# Patient Record
Sex: FEMALE | Race: OTHER | HISPANIC OR LATINO | URBAN - METROPOLITAN AREA
[De-identification: names, ages, dates, MRNs, and addresses within clinical notes are randomized per-mention and may not be internally consistent; named-entity substitution may affect disease eponyms.]

---

## 2019-06-30 ENCOUNTER — EMERGENCY (EMERGENCY)
Facility: HOSPITAL | Age: 19
LOS: 0 days | Discharge: HOME | End: 2019-06-30
Admitting: EMERGENCY MEDICINE
Payer: COMMERCIAL

## 2019-06-30 VITALS
SYSTOLIC BLOOD PRESSURE: 125 MMHG | RESPIRATION RATE: 18 BRPM | DIASTOLIC BLOOD PRESSURE: 78 MMHG | OXYGEN SATURATION: 99 % | TEMPERATURE: 99 F | HEART RATE: 70 BPM

## 2019-06-30 DIAGNOSIS — R10.9 UNSPECIFIED ABDOMINAL PAIN: ICD-10-CM

## 2019-06-30 DIAGNOSIS — R10.2 PELVIC AND PERINEAL PAIN: ICD-10-CM

## 2019-06-30 PROCEDURE — 99283 EMERGENCY DEPT VISIT LOW MDM: CPT

## 2019-06-30 RX ORDER — IBUPROFEN 200 MG
600 TABLET ORAL ONCE
Refills: 0 | Status: COMPLETED | OUTPATIENT
Start: 2019-06-30 | End: 2019-06-30

## 2019-06-30 RX ADMIN — Medication 600 MILLIGRAM(S): at 16:32

## 2019-06-30 NOTE — ED PROVIDER NOTE - PHYSICAL EXAMINATION
GEN: Alert & Oriented x 3, No acute distress. Calm, appropriate.  Head and Neck:  No cervical lymphadenopathy.   ENT:Oral mucosa pink, moist without lesions. No pharyngeal injection noted. No exudate. TM clear bilaterally.  Eyes: PERRL. No conjunctival injection. No scleral icterus.   RESP: Lungs clear to auscult bilat. no wheezes, rhonchi or rales. No retractions. Equal air entry.  CARDIO: regular rate and rhythm, no murmurs, rubs or gallops. Normal S1, S2. Radial pulses 2+ bilaterally.  ABD: Soft, Nondistended. No rebound tenderness/guarding.  No pulsatile mass. No tenderness with palpation x 4 quadrants.   MS: Full ROM of extremities.   SKIN: no rashes/lesions, no petechiae, no ecchymosis.  NEURO: CN II-XII grossly intact. Speech and cognition normal.

## 2019-06-30 NOTE — ED PROVIDER NOTE - CARE PROVIDER_API CALL
Brooke Downs)  OBSGYN  Physicians  [address] [place] [address]  Phone: [phone]  Fax: [phone]  Follow Up Time:

## 2019-06-30 NOTE — ED PROVIDER NOTE - NS ED ROS FT
GEN: (-) fever, (-) chills, (-) malaise(-) change in appetite   HEENT: (-) vision changes, (-) HA, (-) sore throat, (-) ear pain  CV: (-) chest pain, (-) palpitations  PULM: (-) cough, (-) wheezing, (-) dyspnea  GI: (+) abdominal pain,(-) Nausea, (-) Vomiting, (-) Diarrhea, (-) Melena  NEURO: (-) weakness, (-) paresthesias, (-) syncope, (-) lightheadedness, (-) dizziness.  : (-) dysuria, (-) frequency, (-) urgency, (-) vaginal bleeding  MS: (-) back pain, (-) joint pain, (-)myalgias, (-) swelling  SKIN: (-) rashes, (-) new lesions  HEME: (-) bleeding, (-) ecchymosis

## 2019-06-30 NOTE — ED PROVIDER NOTE - CLINICAL SUMMARY MEDICAL DECISION MAKING FREE TEXT BOX
pregnancy test neg. spoke with patient about results. offered pt STI testing, pt declining testing at this time. She will follow up with OBGYN. return precautions given.

## 2019-06-30 NOTE — ED ADULT NURSE NOTE - OBJECTIVE STATEMENT
Pt presents with pelvic pain. patient missed period for 5 days, complains of mild cramping, denies vaginal bleeding.

## 2019-06-30 NOTE — ED PROVIDER NOTE - NSFOLLOWUPINSTRUCTIONS_ED_ALL_ED_FT
Pelvic Pain, Female  Image   Pelvic pain is pain in your lower abdomen, below your belly button and between your hips. The pain may start suddenly (acute), keep coming back (recurring), or last a long time (chronic). Pelvic pain that lasts longer than six months is considered chronic.    Pelvic pain may affect your:  Reproductive organs.  Urinary system.  Digestive tract.  Musculoskeletal system.  There are many potential causes of pelvic pain. Sometimes, the pain can be a result of digestive or urinary conditions, strained muscles or ligaments, or even reproductive conditions. Sometimes the cause of pelvic pain is not known.    Follow these instructions at home:  Take over-the-counter and prescription medicines only as told by your health care provider.  Rest as told by your health care provider.  Do not have sex it if hurts.  Keep a journal of your pelvic pain. Write down:  When the pain started.  Where the pain is located.  What seems to make the pain better or worse, such as food or your menstrual cycle.  Any symptoms you have along with the pain.  Keep all follow-up visits as told by your health care provider. This is important.  Contact a health care provider if:  Medicine does not help your pain.  Your pain comes back.  You have new symptoms.  You have abnormal vaginal discharge or bleeding, including bleeding after menopause.  You have a fever or chills.  You are constipated.  You have blood in your urine or stool.  You have foul-smelling urine.  You feel weak or lightheaded.  Get help right away if:  You have sudden severe pain.  Your pain gets steadily worse.  You have severe pain along with fever, nausea, vomiting, or excessive sweating.  You lose consciousness.  This information is not intended to replace advice given to you by your health care provider. Make sure you discuss any questions you have with your health care provider.

## 2019-06-30 NOTE — ED PROVIDER NOTE - NSFOLLOWUPCLINICS_GEN_ALL_ED_FT
[facility]  OB/GYN  [address] [place] [address]  Phone: [phone]  Fax:   Follow Up Time:

## 2019-06-30 NOTE — ED PROVIDER NOTE - OBJECTIVE STATEMENT
The pt is a 19y Female with no significant PMH is presenting to ED with lower abd cramping x 1 day. pt states she developed moderate lower abdominal cramping last night. Non-radiating. No aggravating or reliving factors. She states it feels like a period cramp, but states she has not started her period and is 5 days late. She states she had a neg pregnancy test at home. She denies f/c/n/v, dysuria, urinary urgency/frequency, flank pain, vaginal discharge, uri symptoms, abx use, recent travel, decreased PO intake.

## 2019-08-27 ENCOUNTER — EMERGENCY (EMERGENCY)
Facility: HOSPITAL | Age: 19
LOS: 0 days | Discharge: HOME | End: 2019-08-27
Attending: EMERGENCY MEDICINE | Admitting: EMERGENCY MEDICINE
Payer: COMMERCIAL

## 2019-08-27 VITALS
HEART RATE: 70 BPM | HEIGHT: 68 IN | RESPIRATION RATE: 20 BRPM | TEMPERATURE: 97 F | DIASTOLIC BLOOD PRESSURE: 70 MMHG | WEIGHT: 134.92 LBS | OXYGEN SATURATION: 98 % | SYSTOLIC BLOOD PRESSURE: 148 MMHG

## 2019-08-27 DIAGNOSIS — K92.1 MELENA: ICD-10-CM

## 2019-08-27 DIAGNOSIS — K62.5 HEMORRHAGE OF ANUS AND RECTUM: ICD-10-CM

## 2019-08-27 DIAGNOSIS — Z91.013 ALLERGY TO SEAFOOD: ICD-10-CM

## 2019-08-27 PROCEDURE — 99283 EMERGENCY DEPT VISIT LOW MDM: CPT

## 2019-08-27 RX ORDER — DOCUSATE SODIUM 100 MG
1 CAPSULE ORAL
Qty: 28 | Refills: 0
Start: 2019-08-27 | End: 2019-09-09

## 2019-08-27 NOTE — ED ADULT NURSE NOTE - OBJECTIVE STATEMENT
pt presents to ed with blood in stool x2 episodes with abdominal pain. denies chest pain, sob, fever, chills, fatigue, hematuria, dysuria, n/v.

## 2019-08-27 NOTE — ED PROVIDER NOTE - PATIENT PORTAL LINK FT
You can access the FollowMyHealth Patient Portal offered by [facility] by registering at the following website: [email or web address]. By joining [employer]’s FollowMyHealth portal, you will also be able to view your health information using other applications (apps) compatible with our system.

## 2019-08-27 NOTE — ED PROVIDER NOTE - PROVIDER TOKENS
PROVIDER:[TOKEN:[[id number]]],PROVIDER:[TOKEN:[[id number]]],PROVIDER:[TOKEN:[[id number]]],PROVIDER:[TOKEN:[[id number]]]

## 2019-08-27 NOTE — ED PROVIDER NOTE - CARE PROVIDERS DIRECT ADDRESSES
[email or web address],DirectAddress_Unknown

## 2019-08-27 NOTE — ED PROVIDER NOTE - ATTENDING CONTRIBUTION TO CARE
18yo F no significant past medical history presenting with BRBPR x2 episodes today. Noted blood on wiping and drops in toilet bowl. Denies constipation. +straining, +pain on defecation. No trauma. c/o lower abdominal cramping while on toilet bowl but now resolved. No hx abdominal surgeries, no weight loss, no fevers/chills, no night sweats, no FHx IBD. No lightheadedness, dizziness  Constitutional: Well appearing. No acute distress. Non toxic.   Eyes: PERRLA. Extraocular movements intact, no entrapment. Conjunctiva normal.   ENT: No nasal discharge. Moist mucus membranes.  Neck: Supple, non tender, full range of motion.  CV: RRR no murmurs, rubs, or gallops. +S1S2.   Pulm: Clear to auscultation bilaterally. Normal work of breathing.  Abd: soft NT ND +BS.   Rectal exam- no external hemorrhoids, no fissures, no rash/lesions. +blood streaked stool, no melena   Ext: Warm and well perfused x4, moving all extremities, no edema.   Psy: Cooperative, appropriate.   Skin: Warm, dry, no rash  Neuro: CN2-12 grossly intact no sensory or motor deficits throughout, no drift, no ataxia  ap- brbpr, likely internal hemorrhoids, ?ibd- will give gi follow-up, stool softeners. no sx symptomatic anemia

## 2019-08-27 NOTE — ED PROVIDER NOTE - PHYSICAL EXAMINATION
Physical Exam    Vital Signs: I have reviewed the initial vital signs.  Constitutional: well-nourished, appears stated age, no acute distress  Cardiovascular: +S1/S2, no murmurs, regular rate, regular rhythm, well-perfused extremities  Respiratory: unlabored respiratory effort, clear to auscultation bilaterally, speaks in full sentences  Gastrointestinal: soft, non-tender abdomen, no pulsatile mass  СВЕТЛАНА (chaperone by Dr Em): No fissures, no hemorrhoids external or internal, no masses, brown stools with streak of blood

## 2019-08-27 NOTE — ED ADULT NURSE NOTE - NSIMPLEMENTINTERV_GEN_ALL_ED
Implemented All Universal Safety Interventions:  [place] to call system. Call bell, personal items and telephone within reach. Instruct patient to call for assistance. Room bathroom lighting operational. Non-slip footwear when patient is off stretcher. Physically safe environment: no spills, clutter or unnecessary equipment. Stretcher in lowest position, wheels locked, appropriate side rails in place.

## 2019-08-27 NOTE — ED PROVIDER NOTE - CARE PROVIDER_API CALL
Fili Garza)  Gastroenterology  [address] [place] [address]  Phone: [phone]  Fax: [phone]  Follow Up Time:     Latoya Elizabeth)  Internal Medicine  [address] [place] [address]  Phone: [phone]  Fax: [phone]  Follow Up Time:     Nigel Dodson)  Gastroenterology  [facility] [place] [address]  Phone: [phone]  Fax: [phone]  Follow Up Time:     Savannah Holman)  Obstetrics and Gynecology  [address] [place] [address]  Phone: [phone]  Fax: [phone]  Follow Up Time:

## 2019-08-27 NOTE — ED PROVIDER NOTE - OBJECTIVE STATEMENT
18 yo f LMP 2 d ago pw 2 bloody bowel movements that were sudden onset today inside the toilet bowl associated with lower abd cramping for 1 d in duration. No SOB, no lightheadedness, no cp, sob, syncope, no palpitations, no sob. No weightloss  I have reviewed available current nursing and previous documentation of past medical, surgical, family, and/or social history.

## 2019-08-27 NOTE — ED PROVIDER NOTE - NS ED ROS FT
Review of Systems    Constitutional: (-) fever (-) weakness (-) diaphoresis   Cardiovascular: (-) chest pain  (-) palpitations  Respiratory: (-) SOB (-) cough   GI: (-) N/V (-) diarrhea  Integumentary: (-) rash (-) redness   Neurological:  (-) focal deficit (-) altered mental status

## 2019-08-28 PROBLEM — Z78.9 OTHER SPECIFIED HEALTH STATUS: Chronic | Status: ACTIVE | Noted: 2019-06-30

## 2020-07-29 ENCOUNTER — EMERGENCY (EMERGENCY)
Facility: HOSPITAL | Age: 20
LOS: 0 days | Discharge: HOME | End: 2020-07-29
Attending: EMERGENCY MEDICINE | Admitting: EMERGENCY MEDICINE
Payer: COMMERCIAL

## 2020-07-29 VITALS
TEMPERATURE: 98 F | SYSTOLIC BLOOD PRESSURE: 122 MMHG | DIASTOLIC BLOOD PRESSURE: 78 MMHG | OXYGEN SATURATION: 99 % | RESPIRATION RATE: 18 BRPM | HEART RATE: 95 BPM

## 2020-07-29 VITALS
TEMPERATURE: 97 F | SYSTOLIC BLOOD PRESSURE: 127 MMHG | DIASTOLIC BLOOD PRESSURE: 59 MMHG | RESPIRATION RATE: 17 BRPM | OXYGEN SATURATION: 98 % | HEART RATE: 88 BPM

## 2020-07-29 DIAGNOSIS — R35.0 FREQUENCY OF MICTURITION: ICD-10-CM

## 2020-07-29 DIAGNOSIS — R30.0 DYSURIA: ICD-10-CM

## 2020-07-29 DIAGNOSIS — Z79.899 OTHER LONG TERM (CURRENT) DRUG THERAPY: ICD-10-CM

## 2020-07-29 DIAGNOSIS — Z91.013 ALLERGY TO SEAFOOD: ICD-10-CM

## 2020-07-29 LAB
APPEARANCE UR: ABNORMAL
BACTERIA # UR AUTO: NEGATIVE — SIGNIFICANT CHANGE UP
BILIRUB UR-MCNC: NEGATIVE — SIGNIFICANT CHANGE UP
COLOR SPEC: YELLOW — SIGNIFICANT CHANGE UP
DIFF PNL FLD: ABNORMAL
EPI CELLS # UR: 6 /HPF — HIGH (ref 0–5)
GLUCOSE UR QL: NEGATIVE — SIGNIFICANT CHANGE UP
HYALINE CASTS # UR AUTO: 3 /LPF — SIGNIFICANT CHANGE UP (ref 0–7)
KETONES UR-MCNC: NEGATIVE — SIGNIFICANT CHANGE UP
LEUKOCYTE ESTERASE UR-ACNC: NEGATIVE — SIGNIFICANT CHANGE UP
NITRITE UR-MCNC: NEGATIVE — SIGNIFICANT CHANGE UP
PH UR: 5.5 — SIGNIFICANT CHANGE UP (ref 5–8)
PROT UR-MCNC: SIGNIFICANT CHANGE UP
RBC CASTS # UR COMP ASSIST: 2 /HPF — SIGNIFICANT CHANGE UP (ref 0–4)
SP GR SPEC: 1.03 — HIGH (ref 1.01–1.02)
UROBILINOGEN FLD QL: SIGNIFICANT CHANGE UP
WBC UR QL: 3 /HPF — SIGNIFICANT CHANGE UP (ref 0–5)

## 2020-07-29 PROCEDURE — 99283 EMERGENCY DEPT VISIT LOW MDM: CPT

## 2020-07-29 RX ORDER — NITROFURANTOIN MACROCRYSTAL 50 MG
1 CAPSULE ORAL
Qty: 10 | Refills: 0
Start: 2020-07-29 | End: 2020-08-02

## 2020-07-29 NOTE — ED PROVIDER NOTE - ATTENDING CONTRIBUTION TO CARE
dsyuria with suprapubic discomfort for over 1 week, no vomiting, nausea, fevers or flank pain. patient was diagnosed with uti with [facility] but did not obtain Rx for abx. came here for continued discomfort. exam reveals mild discomfort with palpation suprapubicly. no cva tendenrss, rest of abd is nml. plan is to obtain ua and upreg.

## 2020-07-29 NOTE — ED PROVIDER NOTE - PATIENT PORTAL LINK FT
You can access the FollowMyHealth Patient Portal offered by [facility] by registering at the following website: [email or web address]. By joining [employer]’s FollowMyHealth portal, you will also be able to view your health information using other applications (apps) compatible with our system.

## 2020-07-29 NOTE — ED ADULT NURSE NOTE - CHPI ED NUR SYMPTOMS NEG
no dizziness/no nausea/no vomiting/no decreased eating/drinking/no chills/no pain/no fever/no tingling

## 2020-07-29 NOTE — ED ADULT NURSE NOTE - OBJECTIVE STATEMENT
Pt presents with worsening suprapubic pain. Pt recently dx with UTI from urgent care, did not receive abx. As per pt, suprapubic pain has been worsening. Denies n/v/fever. Denies radiating pain to the back. Alert and oriented x3. Ambulates independently and steadily at this time.

## 2020-07-29 NOTE — ED PROVIDER NOTE - OBJECTIVE STATEMENT
19 y/o female presents to the ED c/o "I have burning with urination and frequent urination for 3 weeks. I was seen at [facility] and dx with [facility]TI but never got the medication." no abd pain/ n/v/d/fever/ chills/ vaginal discharge/ vaginal pain

## 2020-07-30 LAB
CULTURE RESULTS: SIGNIFICANT CHANGE UP
SPECIMEN SOURCE: SIGNIFICANT CHANGE UP